# Patient Record
Sex: FEMALE
[De-identification: names, ages, dates, MRNs, and addresses within clinical notes are randomized per-mention and may not be internally consistent; named-entity substitution may affect disease eponyms.]

---

## 2021-03-31 PROBLEM — Z00.00 ENCOUNTER FOR PREVENTIVE HEALTH EXAMINATION: Status: ACTIVE | Noted: 2021-03-31

## 2021-04-06 ENCOUNTER — APPOINTMENT (OUTPATIENT)
Dept: OTOLARYNGOLOGY | Facility: CLINIC | Age: 36
End: 2021-04-06
Payer: MEDICAID

## 2021-04-06 VITALS
WEIGHT: 145 LBS | BODY MASS INDEX: 25.69 KG/M2 | HEIGHT: 63 IN | HEART RATE: 68 BPM | TEMPERATURE: 97 F | SYSTOLIC BLOOD PRESSURE: 95 MMHG | DIASTOLIC BLOOD PRESSURE: 68 MMHG

## 2021-04-06 DIAGNOSIS — H90.3 SENSORINEURAL HEARING LOSS, BILATERAL: ICD-10-CM

## 2021-04-06 DIAGNOSIS — Z83.3 FAMILY HISTORY OF DIABETES MELLITUS: ICD-10-CM

## 2021-04-06 DIAGNOSIS — B37.0 CANDIDAL STOMATITIS: ICD-10-CM

## 2021-04-06 DIAGNOSIS — K21.9 GASTRO-ESOPHAGEAL REFLUX DISEASE W/OUT ESOPHAGITIS: ICD-10-CM

## 2021-04-06 DIAGNOSIS — J32.0 CHRONIC MAXILLARY SINUSITIS: ICD-10-CM

## 2021-04-06 DIAGNOSIS — R42 DIZZINESS AND GIDDINESS: ICD-10-CM

## 2021-04-06 DIAGNOSIS — R51.9 HEADACHE, UNSPECIFIED: ICD-10-CM

## 2021-04-06 PROCEDURE — 92567 TYMPANOMETRY: CPT

## 2021-04-06 PROCEDURE — 99204 OFFICE O/P NEW MOD 45 MIN: CPT

## 2021-04-06 PROCEDURE — 99072 ADDL SUPL MATRL&STAF TM PHE: CPT

## 2021-04-06 PROCEDURE — 92557 COMPREHENSIVE HEARING TEST: CPT

## 2021-04-06 RX ORDER — NYSTATIN 100000 [USP'U]/ML
100000 SUSPENSION ORAL 3 TIMES DAILY
Qty: 150 | Refills: 5 | Status: ACTIVE | COMMUNITY
Start: 2021-04-06 | End: 1900-01-01

## 2021-04-06 RX ORDER — OMEPRAZOLE 40 MG/1
40 CAPSULE, DELAYED RELEASE ORAL
Refills: 0 | Status: ACTIVE | COMMUNITY

## 2021-04-06 NOTE — REVIEW OF SYSTEMS
[Post Nasal Drip] : post nasal drip [Dizziness] : dizziness [Ear Noises] : ear noises [Negative] : Heme/Lymph [de-identified] : sore tongue   palate

## 2021-04-06 NOTE — HISTORY OF PRESENT ILLNESS
[de-identified] : c/o problem with balance for about 1 year. Feels off balance.  No true spinnng.  \par No blackout or diplopia.  Patient does have headache  issues-  ? migraine hx\par No hx of ear infections in past.  No problems turning in bed.   \par Has seen several neurologists.   Hx of  ? depression or related to migraine.  Hx of tinnitus.  High pitched - worse on  left.  Also bothered by loud noises.  Occ sore tongue for past several mos.  Problem is intermittent.  Worse in evening. Patient was started on omeprazole yesterday.  \par Occ white coating on tongue.  Also c/o PND.  Occ feels discomfort on soft palate.

## 2021-04-06 NOTE — PHYSICAL EXAM
[Midline] : trachea located in midline position [Laryngoscopy Performed] : laryngoscopy was performed, see procedure section for findings [Normal] : no rashes [de-identified] : sl whitish coating on tongue

## 2021-04-06 NOTE — ASSESSMENT
[FreeTextEntry1] : Patient with multiple issues   C/o problems with balance and concerns about hearing.  Also has had PND and coated tongue .  Ear exam unremarkable - does have findings consistent with mild thrush and reflux.   Did have prior negative MRI.  Rrecommended reflux diet and patient to continue omeprazole as prescribed by her other MD.  Also started on nystatin swish and swallow.\par Feel balance issue may be migraine variant -feel likley not vestibular in view of headache hx however will get vng- if negative would continue with neurology. Follow up after

## 2021-04-19 ENCOUNTER — APPOINTMENT (OUTPATIENT)
Dept: OTOLARYNGOLOGY | Facility: CLINIC | Age: 36
End: 2021-04-19
Payer: MEDICAID

## 2021-04-19 VITALS — TEMPERATURE: 98.1 F | BODY MASS INDEX: 25.69 KG/M2 | WEIGHT: 145 LBS | HEIGHT: 63 IN

## 2021-04-19 DIAGNOSIS — K21.9 GASTRO-ESOPHAGEAL REFLUX DISEASE W/OUT ESOPHAGITIS: ICD-10-CM

## 2021-04-19 DIAGNOSIS — G52.1 DISORDERS OF GLOSSOPHARYNGEAL NERVE: ICD-10-CM

## 2021-04-19 DIAGNOSIS — K14.0 GLOSSITIS: ICD-10-CM

## 2021-04-19 PROCEDURE — 99214 OFFICE O/P EST MOD 30 MIN: CPT

## 2021-04-19 PROCEDURE — 99072 ADDL SUPL MATRL&STAF TM PHE: CPT

## 2021-04-19 PROCEDURE — 92567 TYMPANOMETRY: CPT

## 2021-04-19 PROCEDURE — 92538 CALORIC VSTBLR TEST W/REC: CPT

## 2021-04-19 PROCEDURE — 92540 BASIC VESTIBULAR EVALUATION: CPT

## 2021-04-19 RX ORDER — TRIAMCINOLONE ACETONIDE 1 MG/G
0.1 PASTE DENTAL 3 TIMES DAILY
Qty: 1 | Refills: 4 | Status: ACTIVE | COMMUNITY
Start: 2021-04-19 | End: 1900-01-01

## 2021-04-19 NOTE — ASSESSMENT
[FreeTextEntry1] : reviewed photos from pt w superficial red patches tongue dorsum without ulceration or discrete border\par glossitis intermittent unknown etiology\par baking soda in warm water lavage\par triamcinolone in orabase when symptomatic\par complete rx for Helicobacter\par upper endo now pending

## 2021-04-19 NOTE — HISTORY OF PRESENT ILLNESS
[de-identified] : co soreness dorsum tongue w irritation and redness in 1 cm patches but alternates\par can happen every few weeks\par had course nystatin swish and swallow\par now antibiotics and ppi for Helicobacter amoxicillin and clarithromycin\par omeprazole 40 q d but no help w tongue symptoms

## 2021-05-13 ENCOUNTER — NON-APPOINTMENT (OUTPATIENT)
Age: 36
End: 2021-05-13